# Patient Record
Sex: MALE | URBAN - METROPOLITAN AREA
[De-identification: names, ages, dates, MRNs, and addresses within clinical notes are randomized per-mention and may not be internally consistent; named-entity substitution may affect disease eponyms.]

---

## 2019-05-24 ENCOUNTER — APPOINTMENT (RX ONLY)
Dept: URBAN - METROPOLITAN AREA CLINIC 77 | Facility: CLINIC | Age: 39
Setting detail: DERMATOLOGY
End: 2019-05-24

## 2019-05-24 DIAGNOSIS — L50.1 IDIOPATHIC URTICARIA: ICD-10-CM

## 2019-05-24 DIAGNOSIS — L85.3 XEROSIS CUTIS: ICD-10-CM

## 2019-05-24 DIAGNOSIS — L50.3 DERMATOGRAPHIC URTICARIA: ICD-10-CM

## 2019-05-24 PROBLEM — K21.9 GASTRO-ESOPHAGEAL REFLUX DISEASE WITHOUT ESOPHAGITIS: Status: ACTIVE | Noted: 2019-05-24

## 2019-05-24 PROCEDURE — ? TREATMENT REGIMEN

## 2019-05-24 PROCEDURE — 99203 OFFICE O/P NEW LOW 30 MIN: CPT

## 2019-05-24 PROCEDURE — ? COUNSELING

## 2019-05-24 PROCEDURE — ? PRESCRIPTION

## 2019-05-24 ASSESSMENT — LOCATION DETAILED DESCRIPTION DERM
LOCATION DETAILED: RIGHT ANTERIOR DISTAL UPPER ARM
LOCATION DETAILED: LEFT VENTRAL PROXIMAL FOREARM
LOCATION DETAILED: LEFT CLAVICULAR NECK
LOCATION DETAILED: RIGHT VENTRAL DISTAL FOREARM
LOCATION DETAILED: LEFT MEDIAL SUPERIOR CHEST

## 2019-05-24 ASSESSMENT — LOCATION SIMPLE DESCRIPTION DERM
LOCATION SIMPLE: CHEST
LOCATION SIMPLE: LEFT FOREARM
LOCATION SIMPLE: LEFT ANTERIOR NECK
LOCATION SIMPLE: RIGHT UPPER ARM
LOCATION SIMPLE: RIGHT FOREARM

## 2019-05-24 ASSESSMENT — LOCATION ZONE DERM
LOCATION ZONE: NECK
LOCATION ZONE: TRUNK
LOCATION ZONE: ARM

## 2019-05-24 NOTE — PROCEDURE: TREATMENT REGIMEN
Plan: Location: Body\\nPrescribe: Olux-E 0.05% Topical Foam\\n                  Elidel 1% Topical Cream\\nPharmacy: [facility]\\n\\nOTC: Allegra or Zyrtec 2 tablets a day in the morning.\\n\\nNew patient.\\nPhotos taken.\\n\\nPatient states:\\Sonia has been experiencing some red whelps in the morning, during the day, or even at night, they come and go.\\nThere are days that he can go without having a flare-up.\\Lazara the mornings is when he will have the biggest flare-ups and are big, red and itchy.\\nHe has used the same hygiene products for years and has not changed not one of them.\\Sonia did get  6 months ago, but his wife uses the same detergent soap he was using prior.\\nHe has noticed that when he showers these red whelps are more inflamed.\\nHe does itch throughout the day, but the itch does not keep him awake.\\nHe has brought in photos to show because they are minimal at today's visit.\\nWhy the sudden rash is his concern.\\n\\nDiscussed with patient:\\nWhat he is experiencing is Urticaria that can be tied to stress,lack of sleep, foods, chemicals used to wash clothes, body soap, herbs,etc.\\nHis histamine levels are out of control.\\nI will have him start 2 antihistamines tablets a day of either Allegra or Zyrtec, he is to take them in the morning and until his levels are not so hyperactive.\\nEducated him how our body gets harder to tolerate things as we get older.\\nThe fact him using Flonase to help him alleviate allergies tells me that this can as well be tied to seasonal allergies.\\nI see an allergic shiner underneath his eyes; Allergic shiners are dark circles under the eyes caused by congestion of the nose and sinuses. \\nHe must treat his skin very diligently and by doing this, he must limit soap to his body and only use it for his arm pits and private areas. \\nWe need to keep our body oils; they help by sealing the barrier of our skin, if we use soap daily, our oils get stripped off. \\nI will prescribe a smooth steroid called Olux-E Topical Foam to spot treat the whelps on his body.\\nI will prescribe Elidel Topical Cream to help him treat his face.\\n\\nFollow up in 1 month.
Detail Level: Zone

## 2019-05-28 RX ORDER — PIMECROLIMUS 10 MG/G
CREAM TOPICAL
Qty: 1 | Refills: 2 | Status: ERX | COMMUNITY
Start: 2019-05-28

## 2019-05-28 RX ORDER — CLOBETASOL PROPIONATE 0.5 MG/G
AEROSOL, FOAM TOPICAL
Qty: 1 | Refills: 3 | Status: ERX | COMMUNITY
Start: 2019-05-28

## 2019-05-28 RX ADMIN — PIMECROLIMUS: 10 CREAM TOPICAL at 20:37

## 2019-05-28 RX ADMIN — CLOBETASOL PROPIONATE: 0.5 AEROSOL, FOAM TOPICAL at 20:36

## 2019-06-21 ENCOUNTER — APPOINTMENT (RX ONLY)
Dept: URBAN - METROPOLITAN AREA CLINIC 77 | Facility: CLINIC | Age: 39
Setting detail: DERMATOLOGY
End: 2019-06-21

## 2019-06-21 DIAGNOSIS — L50.1 IDIOPATHIC URTICARIA: ICD-10-CM

## 2019-06-21 DIAGNOSIS — L85.3 XEROSIS CUTIS: ICD-10-CM

## 2019-06-21 DIAGNOSIS — L50.3 DERMATOGRAPHIC URTICARIA: ICD-10-CM

## 2019-06-21 PROCEDURE — ? COUNSELING

## 2019-06-21 PROCEDURE — 99213 OFFICE O/P EST LOW 20 MIN: CPT

## 2019-06-21 PROCEDURE — ? TREATMENT REGIMEN

## 2019-06-21 ASSESSMENT — LOCATION DETAILED DESCRIPTION DERM
LOCATION DETAILED: LEFT CLAVICULAR NECK
LOCATION DETAILED: LEFT MEDIAL SUPERIOR CHEST
LOCATION DETAILED: RIGHT VENTRAL DISTAL FOREARM
LOCATION DETAILED: LEFT VENTRAL PROXIMAL FOREARM
LOCATION DETAILED: RIGHT ANTERIOR DISTAL UPPER ARM

## 2019-06-21 ASSESSMENT — LOCATION SIMPLE DESCRIPTION DERM
LOCATION SIMPLE: CHEST
LOCATION SIMPLE: RIGHT UPPER ARM
LOCATION SIMPLE: LEFT ANTERIOR NECK
LOCATION SIMPLE: LEFT FOREARM
LOCATION SIMPLE: RIGHT FOREARM

## 2019-06-21 ASSESSMENT — LOCATION ZONE DERM
LOCATION ZONE: ARM
LOCATION ZONE: NECK
LOCATION ZONE: TRUNK

## 2019-06-21 NOTE — PROCEDURE: TREATMENT REGIMEN
Detail Level: Zone
Plan: Location: Body\\nPreviously Prescribed: Olux-E 0.05 % topical foam (Apply to affected area once to twice daily when having flare up AVOID FACE)\\nElidel 1 % topical cream (Apply to affected areas on face as needed for flare ups.)\\nPharmacy: [facility]\\nOTC: Allegra or Zyrtec 2 tablets a day in the morning.\\n\\n\\n\\nPatient is here for a one month follow up \\nPatient states he’s seen major improvement and has been using medication as needed \\nPatient is to continue using antihistamines (Zyrtec) twice daily use to the sensitivity of his skin.\\nPatient states he would like to wash body with soap after playing soccer, however I advised him to use a mild body wash that is for sensitive skin. \\nPatient states he’s been using dove body wash\\nWill refer the patient to an allergist \\n\\nHe has used the same hygiene products for years and has not changed not one of them.\\nHe did get  6 months ago, but his wife uses the same detergent soap he was using prior.\\nHe has noticed that when he showers these red whelps are more inflamed.\\nHe does itch throughout the day, but the itch does not keep him awake.\\nHe has brought in photos to show because they are minimal at today's visit.\\nWhy the sudden rash is his concern.\\n\\n\\n\\nFollow up in 1 month.

## 2021-08-13 ENCOUNTER — RX ONLY (OUTPATIENT)
Age: 41
Setting detail: RX ONLY
End: 2021-08-13

## 2021-08-13 RX ORDER — PIMECROLIMUS 10 MG/G
CREAM TOPICAL
Qty: 1 | Refills: 0 | Status: ERX | COMMUNITY
Start: 2021-08-13

## 2021-08-13 RX ORDER — CLOBETASOL PROPIONATE 0.5 MG/G
AEROSOL, FOAM TOPICAL
Qty: 1 | Refills: 0 | Status: ERX | COMMUNITY
Start: 2021-08-13

## 2021-09-27 ENCOUNTER — APPOINTMENT (RX ONLY)
Dept: URBAN - METROPOLITAN AREA CLINIC 77 | Facility: CLINIC | Age: 41
Setting detail: DERMATOLOGY
End: 2021-09-27

## 2021-09-27 DIAGNOSIS — L50.1 IDIOPATHIC URTICARIA: ICD-10-CM

## 2021-09-27 DIAGNOSIS — L50.3 DERMATOGRAPHIC URTICARIA: ICD-10-CM

## 2021-09-27 DIAGNOSIS — L85.3 XEROSIS CUTIS: ICD-10-CM

## 2021-09-27 PROCEDURE — ? TREATMENT REGIMEN

## 2021-09-27 PROCEDURE — ? COUNSELING

## 2021-09-27 PROCEDURE — 99213 OFFICE O/P EST LOW 20 MIN: CPT

## 2021-09-27 ASSESSMENT — LOCATION DETAILED DESCRIPTION DERM
LOCATION DETAILED: LEFT MEDIAL SUPERIOR CHEST
LOCATION DETAILED: LEFT CLAVICULAR NECK
LOCATION DETAILED: RIGHT VENTRAL DISTAL FOREARM
LOCATION DETAILED: RIGHT ANTERIOR DISTAL UPPER ARM
LOCATION DETAILED: LEFT VENTRAL PROXIMAL FOREARM

## 2021-09-27 ASSESSMENT — LOCATION SIMPLE DESCRIPTION DERM
LOCATION SIMPLE: RIGHT FOREARM
LOCATION SIMPLE: RIGHT UPPER ARM
LOCATION SIMPLE: LEFT ANTERIOR NECK
LOCATION SIMPLE: CHEST
LOCATION SIMPLE: LEFT FOREARM

## 2021-09-27 ASSESSMENT — LOCATION ZONE DERM
LOCATION ZONE: NECK
LOCATION ZONE: TRUNK
LOCATION ZONE: ARM

## 2021-09-27 NOTE — PROCEDURE: TREATMENT REGIMEN
Detail Level: Zone
Plan: Location: Body\\nPreviously Prescribed: Olux-E 0.05 % topical foam (Apply to affected area once to twice daily when having flare up AVOID FACE)\\nElidel 1 % topical cream (Apply to affected areas on face as needed for flare ups.)\\nPharmacy: [facility]\\nOTC: Allegra or Zyrtec 2 tablets a day in the morning.\\n\\nFollow up\\nLOV 06/21/2019\\nNo photos \\n\\nPatient is here for Urticaria follow up after two years \\nPatient states that he has been noticing his skin becoming hyperactive recently \\nPatient is to continue using antihistamines (Zyrtec) 40% of the time due to his sensitivity of his skin.\\nPatient states he would like to wash body with soap after playing soccer, however I advised him to use a mild body wash that is for sensitive skin. \\nPatient states he’s been using dove body wash\\nPt has been using Elidel for his face and he has been using Clobetasol steroid for body \\nHe comes today for further evaluation and continuation of treatment \\nHe has noticed that when he showers he gets these red whelps and last inflamed for half day.\\nHe does itch throughout the day, but the itch does not keep him awake.\\n\\nDiscussed with pt:\\nEvaluating him today, pt is shows signs of improvement.\\nReiterated that Urticaria can be tied to stress, lack of sleep, foods, chemicals used to wash clothes, body soap, herbs, etc.\\nHis histamine levels are out of control.\\nI will have him start 2 antihistamines tablets a day of either Allegra or Zyrtec, he is to take them in the morning and until his levels are not so hyperactive.\\nEducated him how our body gets harder to tolerate things as we get older; most likely he will be baseline of Zyrtec at two tablets.\\nThe fact him using Flonase to help him alleviate allergies tells me that this can as well be tied to seasonal\\nallergies.\\nI will refill his Elidel and his Tovet Steroid Foam and He will revisit in one year if he needs to sooner.\\nPt voiced understanding.\\n\\nFollow up 1 year\\n\\n\\nFollow up in 1 month.